# Patient Record
(demographics unavailable — no encounter records)

---

## 2025-04-10 NOTE — REASON FOR VISIT
[FreeTextEntry1] : The patient is here today for follow-up of hypertension, automatic atrial tachycardia, esophageal reflux, resting tremor and a recent episode of sciatica.  The patient is generally in good health.  She gets around with no real difficulties.  In December she was in [place] for a wedding.  While there she developed sciatica on the right side.  She did not contact anybody and just tried to do exercises.  The symptoms lasted for about 6 weeks.  At this point she is back to normal.  I advised the patient that should she have any future episodes that she contact me for possible early steroid use to shorten the duration of the symptoms.  Her acid reflux is well-controlled.  She has to use Pepto-Bismol very rarely.  She is scheduled to see her hematologist in the next few weeks and blood test will be done at that time.

## 2025-04-10 NOTE — DISCUSSION/SUMMARY
[FreeTextEntry1] : Increased effort of breathing-clinically stable and mild. Hypertension-well-controlled. Automatic atrial tachycardia-EKG today consistent with ongoing automatic atrial tachycardia which is asymptomatic History of breast cancer-as per oncology Return visit 6 months with follow-up after review of available data. Total time of the encounter: 30 minutes which included but was not limited to the following: Face-to-face and non face-to-face time personally spent by the physician preparing to see the patient, obtaining and/or resuming separately obtained history, performing a medically appropriate examination and/or evaluation, counseling and educating the patient/family/caregiver, ordering medications, tests or procedures, referring and communicating with other healthcare professionals, documenting clinical information in the electronic health record, independently interpreting results and communicated results to the patient/family/caregiver and care coordination. [EKG obtained to assist in diagnosis and management of assessed problem(s)] : EKG obtained to assist in diagnosis and management of assessed problem(s)

## 2025-04-10 NOTE — PHYSICAL EXAM
[TextEntry] :  general/Constitutional: WD/ WN in NAD H: NC/AT Eyes:  PERRL, sclerae and conjunctivae normal without jaundice or xanthelasma; ENMT:normal teeth, gums and palate with no petechiae, pallor or cyanosis Neck: w/o JVD, thyromegaly or adenopathy; normal venous contour Respiratory: The work of breathing seems to be mildly increased.  The patient does have a prolonged expiratory phase with very soft expiratory wheezes. Heart: [id number], regular rate, tachycardic, normal S1, S2 without murmurs, rubs, gallops, heaves or thrills Vascular exam: normal carotid upstrokes without carotid or abdominal bruits. 2+/2+ pulses to posterior tibialis and dorsalis pedis Abdomen: soft, nontender, bowels sounds normal without hepatomegaly or splenomegaly, masses or bruits Musculoskeletal:without significant kyphosis or scoliosis Extremities: w/o CCE, good capillary filling Skin: no stasis changes; no ulcers  Neuro: AA and O x 3; no focal neurologic deficits Psych: normal mood and affect